# Patient Record
Sex: MALE | Race: WHITE
[De-identification: names, ages, dates, MRNs, and addresses within clinical notes are randomized per-mention and may not be internally consistent; named-entity substitution may affect disease eponyms.]

---

## 2018-12-03 ENCOUNTER — HOSPITAL ENCOUNTER (EMERGENCY)
Dept: HOSPITAL 80 - FED | Age: 23
Discharge: HOME | End: 2018-12-03
Payer: COMMERCIAL

## 2018-12-03 VITALS — SYSTOLIC BLOOD PRESSURE: 125 MMHG | DIASTOLIC BLOOD PRESSURE: 76 MMHG

## 2018-12-03 DIAGNOSIS — S83.411A: Primary | ICD-10-CM
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
## 2018-12-03 DIAGNOSIS — F17.200: ICD-10-CM

## 2018-12-03 NOTE — EDPHY
General


Time Seen by Provider: 12/03/18 15:41


Narrative: 





CHIEF COMPLAINT: 


right knee pain





HISTORY OF PRESENT ILLNESS: 


Patient presents by private vehicle with complaints of right knee pain.  He 

states that he was skiing yesterday when "tweaked my knee." He described 

hyperextension and external rotation of the right knee.  He noted sudden onset 

of pain on the medial aspect of the right knee.  Moderate to severe.  Worse 

with palpation and movement.  Does not radiate.  No numbness or tingling.  No 

weakness.  No injury elsewhere on his person.  He is able to ambulate but 

unable to fully straighten the knee.  Does have previous orthopedic injuries to 

the right knee.  No other associated complaints or modifying factors.





REVIEW OF SYSTEMS:


10 systems were reviewed and negative with the exception of the elements 

mentioned in the history of present illness.





PCP:


Barbie Meadows





SPECIALISTS:


[facility] [place]





PAST MEDICAL HISTORY: 


Orthopedic injuries





PAST SURGICAL HISTORY:


No recent surgery





SOCIAL HISTORY:


Nonsmoker.  [employer] student.  Originally from [place]





FAMILY HISTORY:


Noncontributory





EXAMINATION:


Vitals: Triage VS reviewed


General Appearance:  Alert, no distress.  Well appearing.


Head: normocephalic, atraumatic


Eyes:  Pupils equal and round, no conjunctival pallor or injection


Cardiovascular:  Regular rate.  Symmetric DP PT pulses.  Good signs of 

perfusion lower extremities.


Neurological:  A&O, nonfocal.  Antalgic but steady gait.  Light sensory 

symmetric lower extremities.


Skin:  Warm and dry, no rash.  No petechiae or purpura no laceration or puncture


Extremities:  Tenderness over the right MCL without bony tenderness of the 

knee.  There is no crepitus or deformity.  He is unable to fully straighten the 

right knee but has no bony tenderness of the tibia fibula.  There is no 

tenderness of the right calcaneus, ankle or midfoot.  Range of motion otherwise 

symmetric.  Compartments are soft right lower extremity.


Psychiatric:  Mood and affect normal





DIFFERENTIAL DIAGNOSES:


Including but not limited to sprain, strain, fracture, dislocation








MDM:


3:40 p.m.


Acute right knee Javier with pain medial greater than lateral.  Minimal patellar 

tendon pain.  Range of motion is slightly decreased lacking approximately 15 

of extension.  There is no palpable pain in the remainder of the extremity.  He 

is awake alert.  No acute distress.  Neurovascular intact distally.





3:50 p.m.


X-ray as read by me, radiologist, reveals no acute fracture dislocation.





4:15 p.m.


X-ray as read by radiologist as negative for acute findings.  His pain is 

isolated to the right MCL.  There is no bony tenderness of the tibia or fibula.

  No signs of compartment syndrome.  We discussed weight-bearing as tolerated 

with crutches and Ace wrap.  We discussed follow up with Orthopedics for 

definitive care.  He is requesting Voltaren topical and by mouth as he has 

tolerated these despite his ibuprofen allergy.  We discussed ED precautions.  

He is comfortable this plan.  Discharged home stable condition.








SUPERVISION:


This patient was independently evaluated without direct involvement of or 

examination by the attending physician. 





CONSULTATION:


None








- Diagnostics


Imaging Results: 


 Imaging Impressions





Knee X-Ray  12/03/18 15:15


Impression:  Negative right knee radiographs. 














- History


Smoking Status: Current some day smoker





- Objective


Vital Signs: 


 Initial Vital Signs











Temperature (C)  98.6 F   12/03/18 14:52


 


Heart Rate  91   12/03/18 14:52


 


Respiratory Rate  16   12/03/18 14:52


 


Blood Pressure  136/90 H  12/03/18 14:52


 


O2 Sat (%)  95   12/03/18 14:52








 











O2 Delivery Mode               Room Air














Allergies/Adverse Reactions: 


 





ibuprofen Allergy (Verified 12/03/18 14:56)


 


Penicillins Allergy (Verified 12/03/18 14:56)


 








Home Medications: 














 Medication  Instructions  Recorded


 


Diclofenac Sodium 1% [Voltaren Gel 1 kely TP TID #1 tube 12/03/18





(*)]  


 


Diclofenac Sodium [Voltaren 75 MG 75 mg PO BID #30 tab 12/03/18





(*)]  














Departure





- Departure


Disposition: Home, Routine, Self-Care


Clinical Impression: 


Right knee sprain


Qualifiers:


 Encounter type: initial encounter Involved ligament of knee: medial collateral 

ligament Qualified Code(s): S83.411A - Sprain of medial collateral ligament of 

right knee, initial encounter





Condition: Good


Instructions:  Knee Sprain (ED), Hinged Knee Brace (ED)


Additional Instructions: 


1. Weightbearing as tolerated with light activity


2. Crutches as provided as needed


3. Voltaren topical has requested


4. Tylenol over-the-counter 650 mg every 6 hr


5. Follow up with Orthopedics for definitive care 


Referrals: 


Ethan Jeffers MD [Medical Doctor] - As per Instructions


Prescriptions: 


Diclofenac Sodium [Voltaren 75 MG (*)] 75 mg PO BID #30 tab


Diclofenac Sodium 1% [Voltaren Gel (*)] 1 kely TP TID #1 tube

## 2019-01-26 ENCOUNTER — HOSPITAL ENCOUNTER (EMERGENCY)
Dept: HOSPITAL 80 - FED | Age: 24
Discharge: HOME | End: 2019-01-26
Payer: COMMERCIAL

## 2019-01-26 VITALS — SYSTOLIC BLOOD PRESSURE: 130 MMHG | DIASTOLIC BLOOD PRESSURE: 77 MMHG

## 2019-01-26 DIAGNOSIS — Z98.890: ICD-10-CM

## 2019-01-26 DIAGNOSIS — I82.401: Primary | ICD-10-CM

## 2019-01-26 NOTE — EDPHY
H & P


Stated Complaint: R meniscus srgy-now calf pain.


Time Seen by Provider: 01/26/19 15:15


HPI/ROS: 


HPI:  This is a 23-year-old male who presents with





Chief Complaint: R meniscus srgy-now calf pain.





Location:  Right calf


Quality:  Pain and tightness


Duration:  Several days


Signs and Symptoms:  No bleeding, no radiation, no numbness, no weakness, no 

tingling, no incontinence, no decreased range of motion, no swelling, + pain, 

no fever


Timing:  Gradual onset


Severity:  Mild-to-moderate


Context:  Patient reports that he had right knee meniscus arthroscopy performed 

in [place] on January 4th and then returned several days later to [facility] to return to school.  He reports that approximately several days ago 

he started to developed right calf tightness that is worsened with 

dorsiflexion.  He denies any actual swelling in his calf, shortness of breath, 

chest pain, skin color changes.  No history of clotting disorders in the family


Modifying Factors:  Wearing brace per Orthopedics





Comment: 








ROS:  A comprehensive 10 system review of systems is otherwise negative aside 

from elements mentioned in the history of present illness. 








MEDICAL/SURGICAL/SOCIAL HISTORY: 


Medical history:  Generally healthy.  Does not take any regular medications.


Surgical history:  Denies


Social history:  Enrolled at [employer] ([place], [place]).  Current every day 

tobacco user.








CONSTITUTIONAL:  Well-developed, well-nourished, young adult white male, awake 

and alert, no obvious distress


HEENT: Atraumatic and normocephalic.


NECK: supple, no midline tenderness


Cardiovascular: Normal S1/S2, regular rate, regular rhythm, without murmur rub 

or gallop.


PULMONARY/CHEST:  Symmetrical and nontender. no crepitus. Clear to auscultation 

bilaterally. Good air movement. No accessory muscle usage.


ABDOMEN:  Soft, nondistended, nontender.


BACK:  No midline tenderness


EXTREMITIES:  2/2 pulses, strength 5/5, right KNEE:  In hinged brace; mild 

effusion, mild medial line tenderness, no lateral joint line tenderness, full 

extension to 180, mild Homans sign.  No palpable cords.  good light touch 

sensation. no deformities, no clubbing, no cyanosis or edema.


NEUROLOGICAL: no focal neuro deficits.  GCS 15.  Light touch sensation intact.


SKIN: Warm and dry, no erythema. no rash.  Good capillary refill.   





Source: Patient


Exam Limitations: No limitations





- Personal History


Current Tetanus/Diphtheria Vaccine: Unsure


Current Tetanus Diphtheria and Acellular Pertussis (TDAP): Unsure





- Medical/Surgical History


Hx Asthma: No


Hx Chronic Respiratory Disease: No


Hx Diabetes: No


Hx Cardiac Disease: No


Hx Renal Disease: No


Hx Cirrhosis: No


Hx Alcoholism: No


Hx HIV/AIDS: No


Hx Splenectomy or Spleen Trauma: No


Other PMH: surg R thumb, R meniscus.





- Social History


Smoking Status: Current some day smoker


Constitutional: 


 Initial Vital Signs











Temperature (C)  36.9 C   01/26/19 14:29


 


Heart Rate  91   01/26/19 14:29


 


Respiratory Rate  18   01/26/19 14:29


 


Blood Pressure  130/77 H  01/26/19 14:29


 


O2 Sat (%)  95   01/26/19 14:29











Allergies/Adverse Reactions: 


 





ibuprofen Allergy (Intermediate, Verified 01/26/19 14:28)


 Swelling/neck,face,throat


Penicillins Allergy (Verified 01/26/19 14:28)


 








Home Medications: 














 Medication  Instructions  Recorded


 


Apixaban [Eliquis 30-day Starter 1 kit PO AD #1 kit 01/26/19





Pack]  














Medical Decision Making





- Diagnostics


Imaging Results: 


 Imaging Impressions





Extremity Venous Study  01/26/19 15:18


Impression: Thrombus involving proximal peroneal veins in the proximal right 

calf. No additional evidence of DVT right lower extremity. 


 


Findings discussed with Jazmine Chan PAC  at  16:41 hour, 1/26/2019.


 


 











ED Course/Re-evaluation: 


Vital signs reviewed and stable upon arrival.


Right lower extremity ultrasound ordered


Risk factors to include recent surgery, long plane flight, tobacco use


1620:  Notified by Radiology that ultrasound is positive for DVT; in both 

peroneal veins from lower calf to ankle


This is provoked DVT


Given Eliquis 10 mg ED now and starter pack for same


Patient reports that he can follow up with [facility]





No signs of neurovascular compromise/tenting of skin/compartment syndrome/

extremities and joints examined above and below area of concern and are 

neurovascularly intact.


 


This patient was seen under the supervision of my secondary supervising 

physician.  I evaluated care for this patient independently.  Discussed this 

patient with Dr. McCollester who did not see the patient.  





Differential Diagnosis: 


Leg swelling including but not limited to hypoalbuminemia, congestive heart 

failure, cor pulmonale, chronic venous stasis and DVT.








Departure





- Departure


Disposition: Home, Routine, Self-Care


Clinical Impression: 


 Status post arthroscopic surgery of right knee





Deep vein thrombosis (DVT) of right lower extremity


Qualifiers:


 Affected thrombotic vein of extremity: unspecified vein of extremity Chronicity

: acute Qualified Code(s): I82.401 - Acute embolism and thrombosis of 

unspecified deep veins of right lower extremity





Condition: Good


Instructions:  Deep Vein Thrombosis (ED), Deep Vein Thrombosis Prevention (ED)


Additional Instructions: 


Take Eliquis as prescribed.  This seems to be provoked by surgery, long 

airplane flight and tobacco use.


Please avoid taking NSAIDs including ibuprofen, Motrin, Advil, Aleve while 

taking Eliquis.





Most likely you will only need to take Eliquis for 3 months.  


You will be given the 1st month starter pack in the ER by follow up with 

student health clinic in the next 2 weeks for re-evaluation and you will need 

prescription for continued Eliquis.








Referrals: 


[facility] ,. [Clinic] - As per Instructions


Prescriptions: 


Apixaban [Eliquis 30-day Starter Pack] 1 kit PO AD #1 kit

## 2019-02-06 ENCOUNTER — HOSPITAL ENCOUNTER (EMERGENCY)
Dept: HOSPITAL 80 - FED | Age: 24
Discharge: HOME | End: 2019-02-06
Payer: COMMERCIAL

## 2019-02-06 VITALS — SYSTOLIC BLOOD PRESSURE: 129 MMHG | DIASTOLIC BLOOD PRESSURE: 69 MMHG
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
## 2019-02-06 DIAGNOSIS — I82.401: Primary | ICD-10-CM

## 2019-02-06 DIAGNOSIS — S62.627A: ICD-10-CM

## 2019-02-06 DIAGNOSIS — Z79.01: ICD-10-CM
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
## 2019-02-06 NOTE — ASMTCMCOM
CM Note

 

CM Note                       

Notes:

Patient returns to ED with concerns related to his recent ED visit on 01/16/19.  Patient explains 

to this CM that he needs "pre authorization" for his insurance to fill a prescrription for 

Eliquis.  Chart reviewed. 



Patient received a 30 day trial and prescription for Eliquis on 01/26/19.  This 30 day trial was 

filled on 01/27/19 at [facility] in [place] [phone].  I have contacted [facility] at 

[facility] and she confirms that patient will meet pre -authorization from following prescriber for 

insurance coverage of Eliquis.



Patient states that he has been to [facility] on campus at  and is okay with follow up 

there for onging care.  I have contacted Shelby EGAN [phone] at [facility] and scheduled an 

appointment for patient with Dr. Gerard for Friday, February 8th at 1:00 PM.  Shelby is aware that 

patient has another 10 days of his current Eliquis prescription (per the 30 day trial) and that he 

will need further prescribing and pre-authorization to continue.  



ED records available to Shelby and Dr. Gerard and CM available for further needs.



Patient is aware of his appointment with Dr. Gerard and undersatnds process for obtaining pre 

-authorization through his ongoing (Dr. gerard) prescriber of Eliquis.

 

Date Signed:  02/06/2019 12:09 PM

Electronically Signed By:Shirin Hernandez RN

## 2019-02-06 NOTE — EDPHY
H & P


Time Seen by Provider: 02/06/19 10:32


HPI/ROS: 





CHIEF COMPLAINT:  Right leg pain





HISTORY OF PRESENT ILLNESS:  Tweaked his knee skiing was seen in our ED and 

subsequently had meniscus surgery in [place] about 5 weeks ago.  Was seen in 

our ED on January 26th and diagnosed with DVT and started on Eliquis.  Presents 

today with continued pain in the right leg.  Denies chest pain or shortness of 

breath.  Denies weakness or numbness in the foot.  No redness or on the leg or 

fever.





REVIEW OF SYSTEMS:


Eye: no change in vision


ENT: no sore throat


Cardiac: no chest pain or syncope


Pulmonary: no cough or SOB


Abdomen: no vomiting, diarrhea, abdominal pain


Musculoskeletal:  Left small finger injury 5 weeks ago.


Skin: no rash


Neuro: no headache


Constitutional: no fever


: no urinary symptoms





A comprehensive 10 point review of systems is otherwise negative aside from 

elements mentioned in the history of present illness.





PAST MEDICAL HISTORY:  Arthroscopic surgery and DVT as above





Social history:  From [place]





General Appearance: Alert and conversant, cooperative.


Eyes: No scleral  icterus. 


ENT, Mouth: Normal mucous membranes.


Respiratory: Normal respiratory effort, breath sounds equal, lungs are clear to 

auscultation.


Cardiovascular:  Regular rate and rhythm.


Gastrointestinal:  Abdomen is soft and non tender.


Neurological: Alert, face symmetric, normal motor and sensory in extremities. 


Skin:  No redness warmth or swelling on the right leg.  Incisions clean dry and 

intact.


Musculoskeletal:  Compartments in the right leg are soft.  He can range his 

knee.  Normal motor sensory and dorsalis pedis pulse in the right foot.  He 

does have some tenderness and swelling at the PIP and proximal phalanx of the 

left small finger with no rotation on flexion.


Psychiatric: Not agitated.





Emergency Department course/MDM:





Case management consult for Eliquis and health concerns issues.  He will follow 

up at [facility] for further anticoagulation before he goes home to [place].


Left small finger x-ray.


Pain in right leg would be expected, he does not have signs or symptoms of 

arterial occlusion or cellulitis or compartment syndrome or worsening DVT or PE.





Results including left small finger PIP fracture discussed with the patient, 

this is approximately 5-weeks-old at this time.  There is a little bit of 

clicking with varus and valgus stress at that joint, mandatory hand follow-up.


He is warned he should follow up with the hand specialist back in [place] 

when he returns to discuss management options.


Smoking Status: Current some day smoker


Constitutional: 


 Initial Vital Signs











Temperature (C)  37 C   02/06/19 10:31


 


Heart Rate  82   02/06/19 10:31


 


Respiratory Rate  17   02/06/19 10:31


 


Blood Pressure  137/79 H  02/06/19 10:31


 


O2 Sat (%)  98   02/06/19 10:31








 











O2 Delivery Mode               Room Air














Allergies/Adverse Reactions: 


 





ibuprofen Allergy (Intermediate, Verified 02/06/19 10:30)


 Swelling/neck,face,throat


Penicillins Allergy (Verified 02/06/19 10:30)


 








Home Medications: 














 Medication  Instructions  Recorded


 


Apixaban [Eliquis 30-day Starter 1 kit PO AD #1 kit 01/26/19





Pack]  














Medical Decision Making





- Diagnostics


Imaging Results: 


 Imaging Impressions





Finger X-Ray  02/06/19 10:50


Impression: Dorsal cortical avulsion fracture off the base of the fifth digit 

middle phalanx at the PIP joint with associated soft tissue swelling, but no 

evidence of joint subluxation.











Imaging: I viewed and interpreted images myself





Departure





- Departure


Disposition: Home, Routine, Self-Care


Clinical Impression: 


DVT (deep venous thrombosis)


Qualifiers:


 DVT location: lower extremity Affected thrombotic vein of extremity: 

unspecified vein of extremity Chronicity: acute Laterality: right Qualified Code

(s): I82.401 - Acute embolism and thrombosis of unspecified deep veins of right 

lower extremity





Condition: Good


Instructions:  Deep Vein Thrombosis (ED)


Additional Instructions: 


Continue your Eliquis.  You have a small mostly healed fracture in the PIP 

joint of your left small finger, please follow-up with a hand specialist when 

you return home in March to [place].


Referrals: 


[facility],. [Clinic] - As per Instructions